# Patient Record
Sex: MALE | Race: WHITE | NOT HISPANIC OR LATINO | Employment: FULL TIME | URBAN - METROPOLITAN AREA
[De-identification: names, ages, dates, MRNs, and addresses within clinical notes are randomized per-mention and may not be internally consistent; named-entity substitution may affect disease eponyms.]

---

## 2022-10-06 PROCEDURE — U0004 COV-19 TEST NON-CDC HGH THRU: HCPCS | Performed by: PERSONAL EMERGENCY RESPONSE ATTENDANT

## 2025-01-21 ENCOUNTER — OFFICE VISIT (OUTPATIENT)
Age: 30
End: 2025-01-21
Payer: OTHER MISCELLANEOUS

## 2025-01-21 VITALS
SYSTOLIC BLOOD PRESSURE: 150 MMHG | BODY MASS INDEX: 42.48 KG/M2 | WEIGHT: 303.4 LBS | DIASTOLIC BLOOD PRESSURE: 94 MMHG | HEIGHT: 71 IN

## 2025-01-21 DIAGNOSIS — G25.89 SCAPULAR DYSKINESIS: ICD-10-CM

## 2025-01-21 DIAGNOSIS — S43.432A PARALABRAL CYST OF LEFT SHOULDER, INITIAL ENCOUNTER: ICD-10-CM

## 2025-01-21 DIAGNOSIS — S43.52XA ACROMIOCLAVICULAR SPRAIN, LEFT, INITIAL ENCOUNTER: ICD-10-CM
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
## 2025-01-21 DIAGNOSIS — S43.432A SUPERIOR GLENOID LABRUM LESION OF LEFT SHOULDER, INITIAL ENCOUNTER: ICD-10-CM

## 2025-01-21 DIAGNOSIS — M89.8X1 PERISCAPULAR PAIN: ICD-10-CM

## 2025-01-21 RX ORDER — GLIPIZIDE 10 MG/1
10 TABLET, FILM COATED, EXTENDED RELEASE ORAL DAILY
COMMUNITY
Start: 2024-10-26

## 2025-01-21 RX ORDER — LEVOTHYROXINE SODIUM 100 UG/1
1 TABLET ORAL DAILY
COMMUNITY
Start: 2025-01-03

## 2025-01-21 RX ORDER — IBUPROFEN 600 MG/1
1 TABLET, FILM COATED ORAL 3 TIMES DAILY
COMMUNITY
Start: 2024-11-19 | End: 2025-01-21

## 2025-01-21 RX ORDER — ALLOPURINOL 300 MG/1
TABLET ORAL
COMMUNITY

## 2025-01-21 RX ORDER — LOSARTAN POTASSIUM 100 MG/1
100 TABLET ORAL DAILY
COMMUNITY

## 2025-01-21 RX ORDER — EMPAGLIFLOZIN 25 MG/1
25 TABLET, FILM COATED ORAL DAILY
COMMUNITY

## 2025-01-21 RX ORDER — MELOXICAM 7.5 MG/1
TABLET ORAL
Qty: 30 TABLET | Refills: 1 | Status: SHIPPED | OUTPATIENT
Start: 2025-01-21

## 2025-01-21 RX ORDER — METOPROLOL SUCCINATE 50 MG/1
1 TABLET, EXTENDED RELEASE ORAL DAILY
COMMUNITY
Start: 2024-12-28

## 2025-01-21 NOTE — PROGRESS NOTES
[facility] [place] [facility] Office Visit - Canelo Hwang MD  [facility]    Office Visit       Patient Name: Markell Del Castillo    Chief Complaint:   Chief Complaint   Patient presents with   • Left Shoulder - Pain       Referring Physician: Sara Anderson APRN I appreciate the referral    History of Present Illness:   Markell Del Castillo is a 29 y.o. male who presents with left body part: shoulder Reason: pain.  Onset:Onset: atraumatic and gradual in nature. The issue has been ongoing for 1 year(s). Pain is a 7/10 on the pain scale. Pain is described as Pain Characterization: dull, aching, and throbbing. Associated symptoms include Symptoms: pain, popping, grinding, and giving way/buckling. The pain is worse with sleeping, lying on affected side, and certain movements ; resting, ice, and pain medication and/or NSAID improve the pain. Previous treatments have included: NSAIDS. I have reviewed the patient's history of present illness as noted/entered above.    I have reviewed the patient's past medical history, surgical history, social history, family history, medications, and allergies as noted in the electronic medical record and as noted/entered.  I have reviewed the patient's review of systems as noted/enter and updated as noted in the patient's HPI.      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, prior he was  on machine for 8 years  Date of Injury at Work: November 2024  Mechanism of Injury at Work: repetitive overuse injury; 3 step lift and turn, using both arms/2 sides; left sided machine; 30lbs part 3000x/day  Current Work Status: work restrictions 10lbs no over shoulder height, no vibrating hand tools  TREATMENTS: Ibuprofen, shoulder home exercises - guided exercises,  biofreeze, injection  BMI 42.32, 303lbs  Diagnostic Tests: MRI left shoulder 1/9/2025    LEFT SHOULDER MRI [facility] [place] 1/9/2025    He has 3 areas of pain-some pain about the AC joint, latissimus, periscapular pain    [place]  He's from [place]  His supportive wife is from [place]; [place]    Enjoys: reading, cooking, traveling        I personally reviewed the MRI above he does have a posterior labral tear with paralabral cyst formation, SLAP tearing with posterior extension.  Some signal around the AC joint, AC joint strain      29 y.o. male  Body mass index is 42.32 kg/m².    Subjective   Subjective      Review of Systems   Constitutional: Negative.  Negative for chills, fatigue and fever.   HENT: Negative.  Negative for congestion and dental problem.    Eyes: Negative.  Negative for blurred vision.   Respiratory: Negative.  Negative for shortness of breath.    Cardiovascular: Negative.  Negative for leg swelling.   Gastrointestinal: Negative.  Negative for abdominal pain.   Endocrine: Negative.  Negative for polyuria.   Genitourinary: Negative.  Negative for difficulty urinating.   Musculoskeletal:  Positive for arthralgias.   Skin: Negative.    Allergic/Immunologic: Negative.    Neurological: Negative.    Hematological: Negative.  Negative for adenopathy.   Psychiatric/Behavioral: Negative.  Negative for behavioral problems.         Past Medical History:   Past Medical History:   Diagnosis Date   • Diabetes mellitus    • Disease of thyroid gland        Past Surgical History: History reviewed. No pertinent surgical history.    Family History:   Family History   Problem Relation Age of Onset   • Diabetes Maternal Grandmother    • Diabetes Maternal Uncle        Social History:   Social History     Socioeconomic History   • Marital status:    Tobacco Use   • Smoking status: Former     Current packs/day: 0.00     Average packs/day: 0.3 packs/day for 20.8 years (5.2 ttl  "pk-yrs)     Types: Cigarettes, Cigars     Start date: 2001     Quit date: 2022     Years since quittin.6   • Smokeless tobacco: Never   • Tobacco comments:     I smoked alot when I was a kid. Stopped when I was 13. Started again at 16. Stopped at 19.   Vaping Use   • Vaping status: Never Used   Substance and Sexual Activity   • Alcohol use: Not Currently   • Drug use: Never   • Sexual activity: Yes     Partners: Female     Birth control/protection: None       Medications:   Current Outpatient Medications:   •  allopurinol (ZYLOPRIM) 300 MG tablet, , Disp: , Rfl:   •  glipizide (GLUCOTROL XL) 10 MG 24 hr tablet, Take 1 tablet by mouth Daily., Disp: , Rfl:   •  Jardiance 25 MG tablet tablet, 1 tablet Daily., Disp: , Rfl:   •  levothyroxine (SYNTHROID, LEVOTHROID) 100 MCG tablet, Take 1 tablet by mouth Daily., Disp: , Rfl:   •  losartan (COZAAR) 100 MG tablet, Take 1 tablet by mouth Daily., Disp: , Rfl:   •  metFORMIN ER (GLUCOPHAGE-XR) 500 MG 24 hr tablet, Every 12 (Twelve) Hours., Disp: , Rfl:   •  metoprolol succinate XL (TOPROL-XL) 50 MG 24 hr tablet, Take 1 tablet by mouth Daily., Disp: , Rfl:   •  meloxicam (MOBIC) 7.5 MG tablet, 1 Oral Daily with food., Disp: 30 tablet, Rfl: 1    Allergies: No Known Allergies    The following portions of the patient's history were reviewed and updated as appropriate: allergies, current medications, past family history, past medical history, past social history, past surgical history and problem list.        Objective    Objective      Vital Signs:   Vitals:    25 0759   BP: 150/94   Weight: (!) 138 kg (303 lb 6.4 oz)   Height: 180.3 cm (71\")       Ortho Exam:  Left shoulder    He has 3 areas of pain-some pain about the AC joint, latissimus, periscapular pain  Good rotator cuff strength some pain with DLS but that is not his primary pain generator.  Negative anterior biceps pain at this time.  More periscapular pain and some AC joint pain but no AC joint " instability.    Results Review:   Imaging Results (Last 24 Hours)       ** No results found for the last 24 hours. **            I personally reviewed the MRI above he does have a posterior labral tear with paralabral cyst formation, SLAP tearing with posterior extension.  Some signal around the AC joint, AC joint strain    Procedures             Assessment / Plan      Assessment/Plan:   Problem List Items Addressed This Visit          Musculoskeletal and Injuries    Work related injury - Primary    Relevant Medications    meloxicam (MOBIC) 7.5 MG tablet    Other Relevant Orders    Ambulatory Referral to Physical Therapy for Evaluation & Treatment    Superior glenoid labrum lesion of left shoulder    Relevant Medications    meloxicam (MOBIC) 7.5 MG tablet    Other Relevant Orders    Ambulatory Referral to Physical Therapy for Evaluation & Treatment    Paralabral cyst of left shoulder    Relevant Medications    meloxicam (MOBIC) 7.5 MG tablet    Other Relevant Orders    Ambulatory Referral to Physical Therapy for Evaluation & Treatment    Periscapular pain    Relevant Medications    meloxicam (MOBIC) 7.5 MG tablet    Other Relevant Orders    Ambulatory Referral to Physical Therapy for Evaluation & Treatment       Neuro    Scapular dyskinesis    Relevant Medications    meloxicam (MOBIC) 7.5 MG tablet    Other Relevant Orders    Ambulatory Referral to Physical Therapy for Evaluation & Treatment       Other    Acromioclavicular sprain, left, initial encounter       Left AC joint strain, periscapular pain, latissimus strain-I recommend scapular based physical therapy we did discuss operative versus nonoperative measures.  Operative measures would entail dealing with his posterior labral tearing, SLAP tearing however the bulk of his symptoms are more periscapular.  This may be referred pain so surgery may still be in the equation but he is hopeful to avoid surgery and I agree with conservative course.  Meloxicam provided  with counseling and physical therapy prescription for specific scapular based physical therapy    Left shoulder  10 pound lift restriction  No overhead lifting with left arm  No use of vibratory tools with left arm    MMI not reached    Physical therapy recommended, prescription provided    Follow Up: 6 weeks, no x-rays needed        Canelo Hwang MD, FAAOS  Orthopedic Surgeon, Shoulder Surgery  [facility]  Orthopedics and Sports Medicine  [address], [address]  [place] [address]    & New Location:  [facility] Location  [address], [address]  [place] [address]    01/21/25  12:51 EST

## 2025-01-22 ENCOUNTER — TELEPHONE (OUTPATIENT)
Dept: ORTHOPEDIC SURGERY | Facility: CLINIC | Age: 30
End: 2025-01-22
Payer: COMMERCIAL

## 2025-01-22 NOTE — TELEPHONE ENCOUNTER
Work note is pended, please print off work note, office note from 1/21/25 and PT order and fax to number in message below.  Lainey Plascencia RT (R), ROT

## 2025-01-22 NOTE — TELEPHONE ENCOUNTER
Caller: MARISEL  WITH DORINDA COOK    Best call back number: [phone]    What form or medical record are you requesting: OFFICE NOTE FROM 01/21/2025, WORK STATUS AND PHYSICAL THERAPY ORDER     Who is requesting this form or medical record from you: W/C     How would you like to receive the form or medical records (pick-up, mail, fax): FAX   If fax, what is the fax number: [phone]  Timeframe paperwork needed: ASAP

## 2025-01-24 ENCOUNTER — TREATMENT (OUTPATIENT)
Dept: PHYSICAL THERAPY | Facility: CLINIC | Age: 30
End: 2025-01-24
Payer: OTHER MISCELLANEOUS

## 2025-01-24 DIAGNOSIS — M25.512 ACUTE PAIN OF LEFT SHOULDER: Primary | ICD-10-CM

## 2025-01-24 NOTE — PROGRESS NOTES
Physical Therapy Initial Evaluation and Plan of Care  [facility] [place] [facility] [place]  [address] [address]        Patient: Markell Del Castillo   : 1995  Diagnosis/ICD-10 Code:  Acute pain of left shoulder [M25.512]  Referring practitioner: Canelo Hwang MD  Date of Initial Visit: 2025  Today's Date: 2025  Patient seen for 1 sessions         Visit Diagnoses:    ICD-10-CM ICD-9-CM   1. Acute pain of left shoulder  M25.512 719.41         Subjective Questionnaire: QuickDASH: 20.45     Subjective Evaluation    History of Present Illness  Mechanism of injury: 2024 L shoulder pain in the top/back of his shoulder doing his regular job which requires him to do lots of repetitive motion.  And the pain was more than normal, he sought medical care and was placed on light duty, under 10#, nothing higher than shoulder level.  He thinks the work doc gave him a cortisone injection which gave him some relief but still feels the pain. Heat gives good relief of shoulder pain symptoms.  Left side sleeper, tosses and turns rather frequently due to laying on it for too long that it increases pain  Imaging showed an AC sprain of his L shoulder and a cyst located in his posterior labrum  N/T in hands that he attributes to his carpal tunnel      Patient Occupation:  for 8 years Pain  Current pain ratin  At best pain ratin  At worst pain ratin  Quality: dull ache and throbbing  Relieving factors: rest, relaxation, heat and medications  Aggravating factors: lifting, movement, overhead activity, prolonged positioning, outstretched reach and repetitive movement  Progression: no change    Hand dominance: right    Diagnostic Tests  MRI studies: abnormal    Patient Goals  Patient goals for therapy: decreased pain, increased motion, return to work, independence with ADLs/IADLs and increased strength             Objective          Postural Observations    Additional Postural  Observation Details  Seated: Rounded shoulders, increased thoracic kyphosis, protracted scapulas    Palpation   Left   Tenderness of the teres minor.   Trigger point to teres minor.     Tenderness     Left Shoulder   Tenderness in the AC joint and lateral scapula (teres minor insertion). No tenderness in the biceps tendon (proximal), clavicle and supraspinatus tendon.     Neurological Testing     Reflexes   Left   Biceps (C5/C6): normal (2+)  Brachioradialis (C6): normal (2+)  Triceps (C7): normal (2+)    Right   Biceps (C5/C6): normal (2+)  Brachioradialis (C6): normal (2+)  Triceps (C7): normal (2+)    Active Range of Motion   Left Shoulder   Flexion: 155 degrees with pain  Abduction: 155 degrees with pain  External rotation 0°: 64 degrees   Internal rotation 0°: WFL    Right Shoulder   Flexion: 165 degrees   Abduction: 165 degrees   External rotation 0°: 75 degrees   Internal rotation 0°: WFL    Passive Range of Motion   Left Shoulder   Flexion: 165 degrees   Abduction: 165 degrees   External rotation 0°: 75 degrees     Right Shoulder   Flexion: 165 degrees   Abduction: 165 degrees   External rotation 0°: 75 degrees     Scapular Mobility   Left Shoulder   Scapular mobility: fair  Scapular Mobility with Shoulder to 90° FF   Scapular winging: moderate  Upward rotation: premature    Right Shoulder   Scapular mobility: good  Scapular Mobility with Shoulder to 90° FF   Scapular winging: minimal    Joint Play   Left Shoulder  Hypermobile in the AC joint. Hypomobile in the anterior capsule, posterior capsule and inferior capsule.    Right Shoulder  Joints within functional limits are the anterior capsule, posterior capsule, inferior capsule and AC joint.     Strength/Myotome Testing     Left Shoulder     Planes of Motion   Flexion: 4- (pain)   Abduction: 4- (pain)   External rotation at 0°: 4-   Internal rotation at 0°: 5     Right Shoulder     Planes of Motion   Flexion: 5   Abduction: 5   External rotation at 0°: 5    Internal rotation at 0°: 5     Additional Strength Details  Scapular strength: 3/5 functionally    Tests   Cervical     Left   Negative active compression (Gila).     Left Shoulder   Positive AC shear, empty can, full can and painful arc.   Negative Hawkin's, Speed's and Yergason's.     Additional Tests Details  (+) Lon          Assessment & Plan       Assessment  Impairments: abnormal muscle firing, abnormal muscle tone, abnormal or restricted ROM, activity intolerance, impaired physical strength, lacks appropriate home exercise program and pain with function   Functional limitations: carrying objects, lifting, sleeping, pulling, pushing, uncomfortable because of pain, reaching behind back, reaching overhead and unable to perform repetitive tasks   Assessment details: 29 year old right hand male presenting with left AC joint shoulder pain.  They present signs and symptoms of decreased range of motion, strength, pain with function, and inability to fully participate in work activities.  He demonstrates (+) AC shear, empty/full can, and painful arc likely indicating some level of rotator cuff involvement though was not tender to palpation over any of the cuff tendons.  Postural dysfunction may be contributing to symptoms.  Neurological exam is normal today, cervical exam is normal today.  He will require skilled physical therapy in order to address these deficits to return to full-time full duty with no restrictions.  Prognosis: good    Goals  Plan Goals: STG to be met in 4 weeks  1. Increase L shoulder AROM equal to R shoulder with tolerable discomfort to be able to place objects on shelves over head.  2. Increase L  shoulder external rotation ROM to 70 degrees to assist with less painful dressing.  3. Pt reports decrease in worst pain level to 4/10, so that the pt can perform more activities at home and work.  4. Pt has improved functional activities so that Quick Dash score improves by 5 points.  5. Pt L  scapular strength is 4/5 so that there is a reduction in scapular winging during AROM.    LTG to be met in 12 weeks  1. Pt is independent with each phase of HEP.  2. Pt is able sleep through night without being awaken by shoulder pain.  3. Pt can bear full weight through L UE without shoulder pain so that they can crawl and push objects as needs.  4.  Pt has full L shoulder AROM to be able to do all need activities at home and work.  5.  Pt shoulder girdle strength is 5/5 throughout to perform all lifting activities as needed.    Plan  Therapy options: will be seen for skilled therapy services  Planned modality interventions: iontophoresis, cryotherapy, dry needling, ultrasound, high voltage pulsed current (pain management), TENS, thermotherapy (hydrocollator packs) and electrical stimulation/Russian stimulation  Planned therapy interventions: abdominal trunk stabilization, manual therapy, neuromuscular re-education, body mechanics training, flexibility, functional ROM exercises, joint mobilization, home exercise program, stretching, strengthening, therapeutic activities, soft tissue mobilization and postural training  Frequency: 2x week  Duration in weeks: 12  Treatment plan discussed with: patient        Timed:         Manual Therapy:    10     mins  01439;     Therapeutic Exercise:         mins  97846;     Neuromuscular Michelle:        mins  68582;    Therapeutic Activity:     25     mins  83968;     Gait Training:           mins  14783;     Ultrasound:          mins  79785;    Ionto                                   mins   47846  Self Care                            mins   33578  Canalith Repos         mins 28807      Un-Timed:  Electrical Stimulation:         mins  68619 ( );  Dry Needling          mins self-pay  Traction          mins 42232  Low Eval          Mins  32403  Mod Eval     30     Mins  68004  High Eval                            Mins  32453        Timed Treatment:   35   mins   Total Treatment:      65   mins      PT: Emma Garduno, ANA     License Number: [id number]  Electronically signed by Emma Garduno PT, 01/24/25, 8:08 AM EST    Initial Certification  Certification Period: 4/24/2025  I certify that the therapy services are furnished while this patient is under my care.  The services outlined above are required by this patient, and will be reviewed every 90 days.     PHYSICIAN: ________________________________________________________  Canelo Hwang MD        DATE: ____________________________________________________________    Please sign and return via fax to @[phone]@.. Thank you, [facility].

## 2025-01-27 ENCOUNTER — TREATMENT (OUTPATIENT)
Dept: PHYSICAL THERAPY | Facility: CLINIC | Age: 30
End: 2025-01-27
Payer: OTHER MISCELLANEOUS

## 2025-01-27 DIAGNOSIS — M25.512 ACUTE PAIN OF LEFT SHOULDER: Primary | ICD-10-CM

## 2025-01-27 PROCEDURE — 97112 NEUROMUSCULAR REEDUCATION: CPT

## 2025-01-27 PROCEDURE — 97530 THERAPEUTIC ACTIVITIES: CPT

## 2025-01-27 PROCEDURE — 97140 MANUAL THERAPY 1/> REGIONS: CPT

## 2025-01-27 NOTE — PROGRESS NOTES
Physical Therapy Daily Treatment Note  [facility] [place]  [address], [address]  [place] [address]       Patient: Markell Del Castillo   : 1995  Diagnosis/ICD-10 Code:  Acute pain of left shoulder [M25.512]  Referring practitioner: No ref. provider found  Date of Initial Visit: Type: THERAPY  Noted: 2025  Today's Date: 2025  Patient seen for 2 sessions         Visit Diagnoses:    ICD-10-CM ICD-9-CM   1. Acute pain of left shoulder  M25.512 719.41       Subjective   Markell Del Castillo reports: HEP has been going well. Overall no significant difference or change since his IE.    Access Code: [email or web address]  URL: [email or web address]  Date: 2025  Prepared by: Emma Garduno    Exercises  - Sidelying Shoulder Abduction Palm Forward  - 1 x daily - 7 x weekly - 4 sets - 10 reps  - Sidelying Shoulder External Rotation  - 1 x daily - 7 x weekly - 4 sets - 10 reps  - Sidelying Shoulder Flexion 15 Degrees  - 1 x daily - 7 x weekly - 4 sets - 10 reps  - Supine PNF D2 Flexion with Resistance  - 1 x daily - 7 x weekly - 3 sets - 10 reps    Objective     See Exercise, Manual, and Modality Logs for complete treatment.       Assessment/Plan  Patient tolerated today's session well with progression of scapular muscular reeducation and shoulder stabilization.  He required tactile cueing in order to decrease compensatory muscle activity.  Seems that left latissimus dorsi is tight and potentially limiting full shoulder mobility, this was addressed with stretching activities which he responded well to and improved shoulder mobility after the stretch. Will continue to progress per his tolerance.  Progress per Plan of Care and Progress strengthening /stabilization /functional activity           Timed:         Manual Therapy:    10     mins  69717;     Therapeutic Exercise:         mins  42311;     Neuromuscular Michelle:    20    mins  83559;    Therapeutic Activity:     18     mins  00925;     Gait Training:            mins  19581;     Ultrasound:          mins  64836;    Ionto                                   mins   32563  Self Care                            mins   33798  Canalith Repos         mins 45570      Un-Timed:  Electrical Stimulation:         mins  51574 ( );  Dry Needling          mins self-pay  Traction          mins 67805      Timed Treatment:   48   mins   Total Treatment:     48   mins      Emma Garduno, PT  Physical Therapist

## 2025-01-31 ENCOUNTER — TREATMENT (OUTPATIENT)
Dept: PHYSICAL THERAPY | Facility: CLINIC | Age: 30
End: 2025-01-31
Payer: OTHER MISCELLANEOUS

## 2025-01-31 DIAGNOSIS — M25.512 ACUTE PAIN OF LEFT SHOULDER: Primary | ICD-10-CM

## 2025-01-31 NOTE — PROGRESS NOTES
Physical Therapy Daily Treatment Note  [facility] [place]  [address], [address]  [place] [address]       Patient: Markell Del Castillo   : 1995  Diagnosis/ICD-10 Code:  Acute pain of left shoulder [M25.512]  Referring practitioner: Canelo Hwang MD  Date of Initial Visit: Type: THERAPY  Noted: 2025  Today's Date: 2025  Patient seen for 3 sessions         Visit Diagnoses:    ICD-10-CM ICD-9-CM   1. Acute pain of left shoulder  M25.512 719.41       Subjective   Markell Del Castillo reports: he's been feeling some pain in his anterior shoulder when he completes the ER isometric exercise at home. But overall feels like he is improving.    Objective     See Exercise, Manual, and Modality Logs for complete treatment.       Assessment/Plan  Patient tolerated progression of today's exercises well.  He noted some discomfort in his lower left shoulder blade with shoulder extension exercise, he was instructed through eccentric low trap reeducation for appropriate timing and sequencing.  He returned to the shoulder extension exercise with appropriate timing and sequencing and resolution of discomfort.  He was instructed to complete eccentric lower trap exercise at home and he was able to demonstrate independently.  Will continue to progress per his tolerance.    Progress per Plan of Care and Progress strengthening /stabilization /functional activity           Timed:         Manual Therapy:    15     mins  36311;     Therapeutic Exercise:    15     mins  71543;     Neuromuscular Michelle:    15    mins  69072;    Therapeutic Activity:     12     mins  23220;     Gait Training:           mins  29608;     Ultrasound:          mins  53130;    Ionto                                   mins   44014  Self Care                            mins   55174  Canalith Repos         mins 28287      Un-Timed:  Electrical Stimulation:         mins  50192 ( );  Dry Needling          mins self-pay  Traction          mins  54891      Timed Treatment:   57   mins   Total Treatment:     57   mins      Emma Garduno, PT  Physical Therapist

## 2025-02-05 ENCOUNTER — TREATMENT (OUTPATIENT)
Dept: PHYSICAL THERAPY | Facility: CLINIC | Age: 30
End: 2025-02-05
Payer: OTHER MISCELLANEOUS

## 2025-02-05 DIAGNOSIS — M25.512 ACUTE PAIN OF LEFT SHOULDER: Primary | ICD-10-CM

## 2025-02-05 NOTE — PROGRESS NOTES
Physical Therapy Daily Treatment Note  [facility]- [place]  [address], [facility] [address]  [place] [address]       Patient: Markell Del Castillo   : 1995  Diagnosis/ICD-10 Code:  Acute pain of left shoulder [M25.512]  Referring practitioner: Canelo Hwang MD  Date of Initial Visit: Type: THERAPY  Noted: 2025  Today's Date: 2025  Patient seen for 4 sessions         Visit Diagnoses:    ICD-10-CM ICD-9-CM   1. Acute pain of left shoulder  M25.512 719.41       Subjective   Markell Del Castillo reports: his shoulder has been feeling better every session.    Objective     See Exercise, Manual, and Modality Logs for complete treatment.       Assessment/Plan  Patient tolerated progression of scapular reeducation well and increased loading with strengthening activities with minimal exacerbation of shoulder pain.  He continues to respond well with manual therapy completed prior to progress to the interventions and he was educated to complete stretches at home prior to activities, he demonstrated understanding.  Will continue to progress per his tolerance.    Progress per Plan of Care and Progress strengthening /stabilization /functional activity           Timed:         Manual Therapy:    15     mins  61430;     Therapeutic Exercise:    30     mins  46308;     Neuromuscular Michlele:    8    mins  90417;    Therapeutic Activity:          mins  57152;     Gait Training:           mins  20884;     Ultrasound:          mins  68353;    Ionto                                   mins   14291  Self Care                            mins   19481  Canalith Repos         mins 49155      Un-Timed:  Electrical Stimulation:         mins  27507 ( );  Dry Needling          mins self-pay  Traction          mins 61641      Timed Treatment:   53   mins   Total Treatment:     53   mins      Emma Garduno PT  Physical Therapist

## 2025-02-07 ENCOUNTER — TREATMENT (OUTPATIENT)
Dept: PHYSICAL THERAPY | Facility: CLINIC | Age: 30
End: 2025-02-07
Payer: OTHER MISCELLANEOUS

## 2025-02-07 DIAGNOSIS — M25.512 ACUTE PAIN OF LEFT SHOULDER: Primary | ICD-10-CM

## 2025-02-07 NOTE — PROGRESS NOTES
Physical Therapy Daily Treatment Note  [facility] [place]  [address] [facility] [address]  [place] [address]       Patient: Markell Del Castillo   : 1995  Diagnosis/ICD-10 Code:  Acute pain of left shoulder [M25.512]  Referring practitioner: Canelo Hwang MD  Date of Initial Visit: Type: THERAPY  Noted: 2025  Today's Date: 2025  Patient seen for 5 sessions         Visit Diagnoses:    ICD-10-CM ICD-9-CM   1. Acute pain of left shoulder  M25.512 719.41       Subjective   Markell Del Castillo reports: His shoulder has been relatively normal.  He was sore for about 2 days following his last session.  But overall feels like he is improving.    Objective     See Exercise, Manual, and Modality Logs for complete treatment.       Assessment/Plan  Patient tolerates progression of all activities well with no symptom exacerbation.  He continues to feel like his main limiter is latissimus dorsi tightness which improves following increased duration of stretching in today's session.  He does fatigue moderately quickly with overhead strengthening activities likely due to decreased muscular endurance as part of his injury.  He was educated to continue his HEP at home and demonstrated good understanding.  Will continue to progress per his tolerance.  Am please with the quick progress he has made in a short amount of time.    Progress per Plan of Care and Progress strengthening /stabilization /functional activity           Timed:         Manual Therapy:         mins  45581;     Therapeutic Exercise:    15     mins  26958;     Neuromuscular Michelle:    15    mins  06367;    Therapeutic Activity:     10     mins  30047;     Gait Training:           mins  57943;     Ultrasound:          mins  77885;    Ionto                                   mins   69296  Self Care                            mins   48419  Canalith Repos         mins 91897      Un-Timed:  Electrical Stimulation:         mins  80200 ( );  Dry Needling           mins self-pay  Traction          mins 62754      Timed Treatment:   40   mins   Total Treatment:     40   mins      Emma Garduno PT  Physical Therapist

## 2025-02-10 ENCOUNTER — TREATMENT (OUTPATIENT)
Dept: PHYSICAL THERAPY | Facility: CLINIC | Age: 30
End: 2025-02-10
Payer: OTHER MISCELLANEOUS

## 2025-02-10 DIAGNOSIS — M25.512 ACUTE PAIN OF LEFT SHOULDER: Primary | ICD-10-CM

## 2025-02-10 PROCEDURE — 97110 THERAPEUTIC EXERCISES: CPT

## 2025-02-10 PROCEDURE — 97140 MANUAL THERAPY 1/> REGIONS: CPT

## 2025-02-10 PROCEDURE — 97112 NEUROMUSCULAR REEDUCATION: CPT

## 2025-02-10 NOTE — PROGRESS NOTES
Physical Therapy Daily Treatment Note  [facility] [place]  [address], [facility] [address]  [place] [address]       Patient: Markell Del Castillo   : 1995  Diagnosis/ICD-10 Code:  Acute pain of left shoulder [M25.512]  Referring practitioner: Canelo Hwang MD  Date of Initial Visit: Type: THERAPY  Noted: 2025  Today's Date: 2/10/2025  Patient seen for 6 sessions         Visit Diagnoses:    ICD-10-CM ICD-9-CM   1. Acute pain of left shoulder  M25.512 719.41       Subjective   Markell Del Castillo reports: he was fairly sore following last session but feels like he is improving.     Objective     See Exercise, Manual, and Modality Logs for complete treatment.       Assessment/Plan  Patient demonstrates improved symptoms of shoulder tightness following extensive manual therapy.  Specifically has tightness of latissimus dorsi likely due to muscle guarding from initial injury.  Patient tolerated progression of muscular reeducation and strengthening activities well.  He continues to fatigue quickly likely due to poor muscular endurance and will continue to benefit from endurance based activities both in clinic and at home.  Will continue to progress per his tolerance.  Progress per Plan of Care and Progress strengthening /stabilization /functional activity           Timed:         Manual Therapy:    10     mins  46802;     Therapeutic Exercise:    15     mins  47884;     Neuromuscular Michelle:    15    mins  05356;    Therapeutic Activity:          mins  50895;     Gait Training:           mins  03278;     Ultrasound:          mins  81686;    Ionto                                   mins   30913  Self Care                            mins   43127  Canalith Repos         mins 25688      Un-Timed:  Electrical Stimulation:         mins  77986 (MC );  Dry Needling          mins self-pay  Traction          mins 94969      Timed Treatment:   40   mins   Total Treatment:     40   mins      Emma Garduno PT  Physical  Therapist

## 2025-02-14 ENCOUNTER — TREATMENT (OUTPATIENT)
Dept: PHYSICAL THERAPY | Facility: CLINIC | Age: 30
End: 2025-02-14
Payer: OTHER MISCELLANEOUS

## 2025-02-14 DIAGNOSIS — M25.512 ACUTE PAIN OF LEFT SHOULDER: Primary | ICD-10-CM

## 2025-02-14 NOTE — PROGRESS NOTES
Physical Therapy Daily Treatment Note  [facility] [place]  [address], [facility] [address]  [place] [address]       Patient: aMrkell Del Castillo   : 1995  Diagnosis/ICD-10 Code:  Acute pain of left shoulder [M25.512]  Referring practitioner: Canelo Hwang MD  Date of Initial Visit: Type: THERAPY  Noted: 2025  Today's Date: 2025  Patient seen for 7 sessions         Visit Diagnoses:    ICD-10-CM ICD-9-CM   1. Acute pain of left shoulder  M25.512 719.41       Subjective   Markell Del Castillo reports: shoulder was sore for about 2-3 days after last session.    Objective     See Exercise, Manual, and Modality Logs for complete treatment.       Assessment/Plan  Patient demonstrates improved muscular endurance throughout today's session and was able to complete a longer duration of endurance based exercises before fatigue sets in.  He demonstrates mild scapular dyskinesis with certain activities that he is able to correct following verbal cues.  Will continue to progress per his tolerance.    Progress per Plan of Care and Progress strengthening /stabilization /functional activity           Timed:         Manual Therapy:         mins  51698;     Therapeutic Exercise:    15     mins  37342;     Neuromuscular Michelle:    15    mins  09697;    Therapeutic Activity:     25     mins  00954;     Gait Training:           mins  60219;     Ultrasound:          mins  62256;    Ionto                                   mins   49253  Self Care                            mins   04696  Canalith Repos         mins 65456      Un-Timed:  Electrical Stimulation:         mins  23965 ( );  Dry Needling          mins self-pay  Traction          mins 96913      Timed Treatment:   55   mins   Total Treatment:     55   mins      Emma Garduno PT  Physical Therapist

## 2025-02-17 ENCOUNTER — TREATMENT (OUTPATIENT)
Dept: PHYSICAL THERAPY | Facility: CLINIC | Age: 30
End: 2025-02-17
Payer: OTHER MISCELLANEOUS

## 2025-02-17 DIAGNOSIS — M25.512 ACUTE PAIN OF LEFT SHOULDER: Primary | ICD-10-CM

## 2025-02-17 PROCEDURE — 97112 NEUROMUSCULAR REEDUCATION: CPT

## 2025-02-17 PROCEDURE — 97530 THERAPEUTIC ACTIVITIES: CPT

## 2025-02-17 PROCEDURE — 97140 MANUAL THERAPY 1/> REGIONS: CPT

## 2025-02-17 PROCEDURE — 97110 THERAPEUTIC EXERCISES: CPT

## 2025-02-17 NOTE — PROGRESS NOTES
Physical Therapy Daily Treatment Note  [facility] [place]  [address], [facility] [address]  [place] [address]       Patient: Markell Del Castillo   : 1995  Diagnosis/ICD-10 Code:  Acute pain of left shoulder [M25.512]  Referring practitioner: Canelo Hwang MD  Date of Initial Visit: Type: THERAPY  Noted: 2025  Today's Date: 2025  Patient seen for 8 sessions         Visit Diagnoses:    ICD-10-CM ICD-9-CM   1. Acute pain of left shoulder  M25.512 719.41       Subjective   Markell Del Castillo reports: he feels mainly limited with loaded extension exercises.    Objective     See Exercise, Manual, and Modality Logs for complete treatment.       Assessment/Plan  Patient tolerated progressions of loading exercises well though was significantly fatigued at the end of today's session. He demonstrates improved ability to tolerate more load with the LUE. Scapular dyskinesis has improved though starts to return once he's more fatigued. Will continue to progress per his tolerance.     Progress per Plan of Care and Progress strengthening /stabilization /functional activity           Timed:         Manual Therapy:    8     mins  19460;     Therapeutic Exercise:    15     mins  38582;     Neuromuscular Michelle:    15    mins  67917;    Therapeutic Activity:     15     mins  94116;     Gait Training:           mins  31274;     Ultrasound:          mins  12911;    Ionto                                   mins   03591  Self Care                            mins   49916  Canalith Repos         mins 18353      Un-Timed:  Electrical Stimulation:         mins  61926 ( );  Dry Needling          mins self-pay  Traction          mins 03745      Timed Treatment:   53   mins   Total Treatment:     53   mins      Emma Garduno PT  Physical Therapist

## 2025-02-21 ENCOUNTER — TREATMENT (OUTPATIENT)
Dept: PHYSICAL THERAPY | Facility: CLINIC | Age: 30
End: 2025-02-21
Payer: OTHER MISCELLANEOUS

## 2025-02-21 DIAGNOSIS — M25.512 ACUTE PAIN OF LEFT SHOULDER: Primary | ICD-10-CM

## 2025-02-21 NOTE — PROGRESS NOTES
Physical Therapy Daily Treatment Note  [facility]- [place]  [address], [facility] [address]  [place] [address]       Patient: Markell Del Castillo   : 1995  Diagnosis/ICD-10 Code:  Acute pain of left shoulder [M25.512]  Referring practitioner: Canelo Hwang MD  Date of Initial Visit: Type: THERAPY  Noted: 2025  Today's Date: 2025  Patient seen for 9 sessions         Visit Diagnoses:    ICD-10-CM ICD-9-CM   1. Acute pain of left shoulder  M25.512 719.41       Subjective   Markell Del Castillo reports: he has not woken up with no shoulder pain the past two days.     Objective     See Exercise, Manual, and Modality Logs for complete treatment.       Assessment/Plan  Patient continues to demonstrate improvement of mobility, strength, and function as well as good tolerance to progression of activities in the clinic.  He required verbal cueing for appropriate timing and sequencing of scapular musculature, he was able to demonstrate correctly following cueing.  He was instructed to continue current HEP and he demonstrated good understanding.  Will continue to progress per his tolerance.  Progress per Plan of Care and Progress strengthening /stabilization /functional activity           Timed:         Manual Therapy:         mins  42198;     Therapeutic Exercise:    22     mins  24402;     Neuromuscular Michelle:    8    mins  52488;    Therapeutic Activity:     25     mins  43341;     Gait Training:           mins  00095;     Ultrasound:          mins  70844;    Ionto                                   mins   82420  Self Care                            mins   97914  Canalith Repos         mins 76856      Un-Timed:  Electrical Stimulation:         mins  47577 (MC );  Dry Needling          mins self-pay  Traction          mins 95293      Timed Treatment:   55   mins   Total Treatment:     55   mins      Emma Garduno PT  Physical Therapist   Biopsy Type: H and E Consent: Written consent was obtained and risks were reviewed including but not limited to scarring, infection, bleeding, scabbing, incomplete removal, nerve damage and allergy to anesthesia. Electrodesiccation And Curettage Text: The wound bed was treated with electrodesiccation and curettage after the biopsy was performed. Anesthesia Volume In Cc (Will Not Render If 0): 0.5 Render Post-Care Instructions In Note?: no Silver Nitrate Text: The wound bed was treated with silver nitrate after the biopsy was performed. Was A Bandage Applied: Yes Hemostasis: Drysol Curettage Text: The wound bed was treated with curettage after the biopsy was performed. Detail Level: Simple Biopsy Method: Dermablade Billing Type: Third-Party Bill Lab: [id number] Post-Care Instructions: I reviewed with the patient in detail post-care instructions. Patient is to keep the biopsy site dry overnight, and then apply bacitracin twice daily until healed. Patient may apply hydrogen peroxide soaks to remove any crusting. Cryotherapy Text: The wound bed was treated with cryotherapy after the biopsy was performed. Lab Facility: [id number] Additional Anesthesia Volume In Cc (Will Not Render If 0): 0 Anesthesia Type: 1% lidocaine with epinephrine Wound Care: Petrolatum Dressing: bandage Notification Instructions: Patient will be notified of biopsy results. However, patient instructed to call the office if not contacted within 2 weeks. Electrodesiccation Text: The wound bed was treated with electrodesiccation after the biopsy was performed. Depth Of Biopsy: dermis Type Of Destruction Used: Curettage

## 2025-02-24 ENCOUNTER — TREATMENT (OUTPATIENT)
Dept: PHYSICAL THERAPY | Facility: CLINIC | Age: 30
End: 2025-02-24
Payer: OTHER MISCELLANEOUS

## 2025-02-24 DIAGNOSIS — M25.512 ACUTE PAIN OF LEFT SHOULDER: Primary | ICD-10-CM

## 2025-02-24 NOTE — PROGRESS NOTES
Physical Therapy Daily Treatment Note  [facility]- [place]  [address], [facility] [address]  [place] [address]       Patient: Markell Del Castillo   : 1995  Diagnosis/ICD-10 Code:  Acute pain of left shoulder [M25.512]  Referring practitioner: Canelo Hwang MD  Date of Initial Visit: Type: THERAPY  Noted: 2025  Today's Date: 2025  Patient seen for 10 sessions         Visit Diagnoses:    ICD-10-CM ICD-9-CM   1. Acute pain of left shoulder  M25.512 719.41       Subjective   Markell Del Castillo reports: no pain over the weekend. Overall he feels like he's 90% better since his initial injury.    Objective     See Exercise, Manual, and Modality Logs for complete treatment.       Assessment/Plan  Patient demonstrates good tolerance with progression strengthening, muscular reeducation, and functional movements.  Patient demonstrates improved timing and sequencing of his scapular musculature with all activities and is able to complete without eliciting painful symptoms.  Patient has demonstrated good overall improvement since his initial evaluation and I expect his timely return to full job duties within the next couple weeks once he is cleared by Dr. Hwang.  Plan for full reassessment at his next visit prior to seeing Dr. Hwang for his follow-up visit.  Progress per Plan of Care and Progress strengthening /stabilization /functional activity           Timed:         Manual Therapy:         mins  09755;     Therapeutic Exercise:    15     mins  35355;     Neuromuscular Michelle:    15    mins  04374;    Therapeutic Activity:     25     mins  76315;     Gait Training:           mins  29000;     Ultrasound:          mins  58026;    Ionto                                   mins   58313  Self Care                            mins   12288  Canalith Repos         mins 10147      Un-Timed:  Electrical Stimulation:         mins  80986 ( );  Dry Needling          mins self-pay  Traction          mins 23901      Timed  Treatment:   55   mins   Total Treatment:     55   mins      Emma Garduno, PT  Physical Therapist

## 2025-02-28 ENCOUNTER — TREATMENT (OUTPATIENT)
Dept: PHYSICAL THERAPY | Facility: CLINIC | Age: 30
End: 2025-02-28
Payer: OTHER MISCELLANEOUS

## 2025-02-28 DIAGNOSIS — M25.512 ACUTE PAIN OF LEFT SHOULDER: Primary | ICD-10-CM

## 2025-02-28 NOTE — PROGRESS NOTES
Physical Therapy Daily Treatment Note  [facility]- [place]  [address], [facility] [address]  [place] [address]       Patient: Markell Del Castillo   : 1995  Diagnosis/ICD-10 Code:  Acute pain of left shoulder [M25.512]  Referring practitioner: Canelo Hwang MD  Date of Initial Visit: Type: THERAPY  Noted: 2025  Today's Date: 2025  Patient seen for 11 sessions         Visit Diagnoses:    ICD-10-CM ICD-9-CM   1. Acute pain of left shoulder  M25.512 719.41       Subjective   Markell Del Castillo reports: his shoulder has been feeling a lot better than his initial evaluation. He has been able to do all the motions he is required at to complete at work with no pain or difficulties. He is able to do all of his house chores and his hygiene without any pain.   QuickDASH: 0%  Pain: 0/10    Objective          Active Range of Motion   Left Shoulder   Flexion: 165 degrees   Abduction: 165 degrees   External rotation 0°: 75 degrees     Right Shoulder   Flexion: 160 degrees   Abduction: 160 degrees   External rotation 0°: 75 degrees     Strength/Myotome Testing     Left Shoulder     Planes of Motion   Flexion: 5   Abduction: 5   External rotation at 0°: 5   Internal rotation at 0°: 5     Isolated Muscles   Lower trapezius: 4+   Rhomboids: 4+   Subscapularis: 5     Right Shoulder     Planes of Motion   Flexion: 5   Abduction: 5   External rotation at 0°: 5   Internal rotation at 0°: 5     Isolated Muscles   Lower trapezius: 4+   Rhomboids: 4+   Subscapularis: 5     Tests   Cervical     Left   Negative active compression ([place]).     Left Shoulder   Negative AC shear, empty can and painful arc.     Additional Tests Details  (-) Lon        See Exercise, Manual, and Modality Logs for complete treatment.       Assessment/Plan  Patient has met all physical therapy goals for this episode of care. He has full AROM, strength, and function of his left shoulder with today's reassessment test and measures. His QuickDASH  score has improve to 0% disability. He was instructed to reach out if symptoms return or his function worsens.    Other discharge for this episode of care.    STG to be met in 4 weeks  1. Increase L shoulder AROM equal to R shoulder with tolerable discomfort to be able to place objects on shelves over head. MET  2. Increase L  shoulder external rotation ROM to 70 degrees to assist with less painful dressing. MET  3. Pt reports decrease in worst pain level to 4/10, so that the pt can perform more activities at home and work. MET  4. Pt has improved functional activities so that Quick Dash score improves by 5 points. MET  5. Pt L scapular strength is 4/5 so that there is a reduction in scapular winging during AROM. MET     LTG to be met in 12 weeks  1. Pt is independent with each phase of HEP. MET  2. Pt is able sleep through night without being awaken by shoulder pain. MET  3. Pt can bear full weight through L UE without shoulder pain so that they can crawl and push objects as needs. MET  4.  Pt has full L shoulder AROM to be able to do all need activities at home and work. MET  5.  Pt shoulder girdle strength is 5/5 throughout to perform all lifting activities as needed. MET       Timed:         Manual Therapy:         mins  93019;     Therapeutic Exercise:         mins  83629;     Neuromuscular Michelle:        mins  46696;    Therapeutic Activity:     30     mins  44318;     Gait Training:           mins  27901;     Ultrasound:          mins  32157;    Ionto                                   mins   70057  Self Care                            mins   68404  Canalith Repos         mins 06604      Un-Timed:  Electrical Stimulation:         mins  31634 ( );  Dry Needling          mins self-pay  Traction          mins 65025      Timed Treatment:   30   mins   Total Treatment:     30   mins      Emma Garduno PT  Physical Therapist

## 2025-03-04 ENCOUNTER — OFFICE VISIT (OUTPATIENT)
Age: 30
End: 2025-03-04
Payer: OTHER MISCELLANEOUS

## 2025-03-04 VITALS
WEIGHT: 301 LBS | DIASTOLIC BLOOD PRESSURE: 100 MMHG | HEIGHT: 71 IN | SYSTOLIC BLOOD PRESSURE: 152 MMHG | BODY MASS INDEX: 42.14 KG/M2

## 2025-03-04 DIAGNOSIS — S43.432A PARALABRAL CYST OF LEFT SHOULDER, INITIAL ENCOUNTER: ICD-10-CM

## 2025-03-04 DIAGNOSIS — M89.8X1 PERISCAPULAR PAIN: ICD-10-CM

## 2025-03-04 DIAGNOSIS — S43.52XA ACROMIOCLAVICULAR SPRAIN, LEFT, INITIAL ENCOUNTER: ICD-10-CM

## 2025-03-04 DIAGNOSIS — S43.432A SUPERIOR GLENOID LABRUM LESION OF LEFT SHOULDER, INITIAL ENCOUNTER: Primary | ICD-10-CM

## 2025-03-04 DIAGNOSIS — G25.89 SCAPULAR DYSKINESIS: ICD-10-CM
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
## 2025-03-04 PROCEDURE — 99213 OFFICE O/P EST LOW 20 MIN: CPT | Performed by: ORTHOPAEDIC SURGERY

## 2025-03-04 NOTE — PROGRESS NOTES
[facility] Orthopaedic Surgery Office Follow Up - Canelo Hwang MD  [facility]    Office Follow Up Visit       Patient Name: Markell Del Castillo    Chief Complaint:   Chief Complaint   Patient presents with   • Follow-up     6 week recheck - Superior glenoid labrum lesion of left shoulder;   Work related injury;  Paralabral cyst of left shoulder; Periscapular pain           Referring Physician: Bettina Vera, DO  - I appreciate the referral    History of Present Illness:   It has been 6  week(s) since Markell Del Castillo's last visit. Markell Del Castillo returns to clinic today for F/U: follow-up of leftBody Part: shoulderReason: pain. The issue has been ongoing for 1 year(s). Markell Del Castillo rates HIS/HER: hispain at 0/10 on the pain scale. Previous/current treatments: NSAIDS and physical therapy. Current symptoms:Symptoms: grinding. The pain is worse with  with rotation ; resting and heat improves the pain. Overall, he/she: heis doing better but still having some limitations. I have reviewed the patient's history of present illness as noted/entered above.    I have reviewed the patient's past medical history, surgical history, social history, family history, medications, and allergies as noted in the electronic medical record and as noted/entered.  I have reviewed the patient's review of systems as noted/enter and updated as noted in the patient's HPI.      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, prior he was  on machine for 8 years  Date of Injury at Work: November 2024  Mechanism of Injury at Work: repetitive overuse injury; 3 step lift and turn, using both arms/2 sides; left sided machine; 30lbs part 3000x/day  Current Work Status: work restrictions 10lbs no over shoulder height, no  vibrating hand tools  TREATMENTS: Ibuprofen, shoulder home exercises - guided exercises, biofreeze, injection  BMI 42.32, 303lbs  Diagnostic Tests: MRI left shoulder 1/9/2025     LEFT SHOULDER MRI [facility] and Open MRI [place] 1/9/2025     He has 3 areas of pain-some pain about the AC joint, latissimus, periscapular pain     [place]  He's from [facility]  His supportive wife is from [facility]; [place]     Enjoys: reading, cooking, traveling          I personally reviewed the MRI above he does have a posterior labral tear with paralabral cyst formation, SLAP tearing with posterior extension.  Some signal around the AC joint, AC joint strain        29 y.o. male  Body mass index is 42.32 kg/m².    PRIOR:  Left AC joint strain, periscapular pain, latissimus strain-I recommend scapular based physical therapy we did discuss operative versus nonoperative measures.  Operative measures would entail dealing with his posterior labral tearing, SLAP tearing however the bulk of his symptoms are more periscapular.  This may be referred pain so surgery may still be in the equation but he is hopeful to avoid surgery and I agree with conservative course.  Meloxicam provided with counseling and physical therapy prescription for specific scapular based physical therapy     Left shoulder  10 pound lift restriction  No overhead lifting with left arm  No use of vibratory tools with left arm      3/4/2025:  LEFT SHOULDER  Completed PT  Improved/better but still has some concerns about heavy lifting he has 1 process where he has to lift at least 30 pounds or more to 200 times in 2-hour shifts.  Supportive wife present.  PT with [employer] work  Counseled on increasing the weight amount to 20 pounds still avoiding overhead lifting with hopes that he will be ready for a full duty when we see him back in 6 weeks with 1 additional visit to follow anticipate      Subjective   Subjective      Review of Systems    Constitutional:  Negative for chills, fever, unexpected weight gain and unexpected weight loss.   HENT:  Negative for congestion, postnasal drip and rhinorrhea.    Eyes:  Negative for blurred vision.   Respiratory:  Negative for shortness of breath.    Cardiovascular:  Negative for leg swelling.   Gastrointestinal:  Negative for abdominal pain, nausea and vomiting.   Genitourinary:  Negative for difficulty urinating.   Musculoskeletal:  Positive for arthralgias. Negative for gait problem, joint swelling and myalgias.   Skin:  Negative for skin lesions and wound.   Neurological:  Negative for dizziness, weakness, light-headedness and numbness.   Hematological:  Does not bruise/bleed easily.   Psychiatric/Behavioral:  Negative for depressed mood.    All other systems reviewed and are negative.       Past Medical History:   Past Medical History:   Diagnosis Date   • Diabetes mellitus    • Disease of thyroid gland        Past Surgical History: No past surgical history on file.    Family History:   Family History   Problem Relation Age of Onset   • Diabetes Maternal Grandmother    • Diabetes Maternal Uncle        Social History:   Social History     Socioeconomic History   • Marital status:    Tobacco Use   • Smoking status: Former     Current packs/day: 0.00     Average packs/day: 0.3 packs/day for 20.8 years (5.2 ttl pk-yrs)     Types: Cigarettes, Cigars     Start date: 2001     Quit date: 2022     Years since quittin.7   • Smokeless tobacco: Never   • Tobacco comments:     I smoked alot when I was a kid. Stopped when I was 13. Started again at 16. Stopped at 19.   Vaping Use   • Vaping status: Never Used   Substance and Sexual Activity   • Alcohol use: Not Currently   • Drug use: Never   • Sexual activity: Yes     Partners: Female     Birth control/protection: None       Medications:   Current Outpatient Medications:   •  allopurinol (ZYLOPRIM) 300 MG tablet, , Disp: , Rfl:   •  glipizide  "(GLUCOTROL XL) 10 MG 24 hr tablet, Take 1 tablet by mouth Daily., Disp: , Rfl:   •  Jardiance 25 MG tablet tablet, 1 tablet Daily., Disp: , Rfl:   •  levothyroxine (SYNTHROID, LEVOTHROID) 100 MCG tablet, Take 1 tablet by mouth Daily., Disp: , Rfl:   •  losartan (COZAAR) 100 MG tablet, Take 1 tablet by mouth Daily., Disp: , Rfl:   •  meloxicam (MOBIC) 7.5 MG tablet, 1 Oral Daily with food., Disp: 30 tablet, Rfl: 1  •  metFORMIN ER (GLUCOPHAGE-XR) 500 MG 24 hr tablet, Every 12 (Twelve) Hours., Disp: , Rfl:   •  metoprolol succinate XL (TOPROL-XL) 50 MG 24 hr tablet, Take 1 tablet by mouth Daily., Disp: , Rfl:     Allergies: No Known Allergies    The following portions of the patient's history were reviewed and updated as appropriate: allergies, current medications, past family history, past medical history, past social history, past surgical history and problem list.        Objective    Objective      Vital Signs:   Vitals:    03/04/25 0807   BP: 152/100   Weight: (!) 137 kg (301 lb)   Height: 180.3 cm (70.98\")       Ortho Exam:  Left shoulder improvements in periscapular pain still has some discomfort with DLS testing but more latissimus and periscapular type pain overall well-built excellent strength    Results Review:  Imaging Results (Last 24 Hours)       ** No results found for the last 24 hours. **            Procedures            Assessment / Plan      Assessment/Plan:   Problem List Items Addressed This Visit    None      Left shoulder work-related injury improvements noted clinically still has some limitations and still some concerns about return to full duty.    Left shoulder 20 pound lift restriction, no overhead lifting with left arm    Follow Up: 6 weeks, no x-rays needed  X-rays at next clinic appointment: None      Canelo Hwang MD, FAAOS  Orthopedic Surgeon, Shoulder Surgery  [facility]  Orthopedics and Sports Medicine  [address], [address]  [place] [address]    & New " Location:  [facility] Location  [address], [address]  [place] [address]    03/04/25  08:11 EST

## 2025-03-05 ENCOUNTER — TELEPHONE (OUTPATIENT)
Dept: ORTHOPEDIC SURGERY | Facility: CLINIC | Age: 30
End: 2025-03-05

## 2025-03-05 NOTE — TELEPHONE ENCOUNTER
Caller: MARISEL COOK      What form or medical record are you requesting: WORK NOTE FROM 03/04/2025 AND WORK STATUS     Who is requesting this form or medical record from you: W/C     How would you like to receive the form or medical records (pick-up, mail, fax): FAX   If fax, what is the fax number: [phone]    Timeframe paperwork needed: ASAP

## 2025-04-15 ENCOUNTER — OFFICE VISIT (OUTPATIENT)
Age: 30
End: 2025-04-15
Payer: OTHER MISCELLANEOUS

## 2025-04-15 ENCOUNTER — TELEPHONE (OUTPATIENT)
Dept: ORTHOPEDIC SURGERY | Facility: CLINIC | Age: 30
End: 2025-04-15
Payer: COMMERCIAL

## 2025-04-15 VITALS
SYSTOLIC BLOOD PRESSURE: 142 MMHG | DIASTOLIC BLOOD PRESSURE: 104 MMHG | WEIGHT: 295 LBS | BODY MASS INDEX: 41.3 KG/M2 | HEIGHT: 71 IN

## 2025-04-15 DIAGNOSIS — S29.012D STRAIN OF LATISSIMUS DORSI MUSCLE, SUBSEQUENT ENCOUNTER: ICD-10-CM

## 2025-04-15 DIAGNOSIS — G25.89 SCAPULAR DYSKINESIS: ICD-10-CM

## 2025-04-15 DIAGNOSIS — S43.432A SUPERIOR GLENOID LABRUM LESION OF LEFT SHOULDER, INITIAL ENCOUNTER: Primary | ICD-10-CM

## 2025-04-15 DIAGNOSIS — S43.432A PARALABRAL CYST OF LEFT SHOULDER, INITIAL ENCOUNTER: ICD-10-CM
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
## 2025-04-15 DIAGNOSIS — M89.8X1 PERISCAPULAR PAIN: ICD-10-CM

## 2025-04-15 PROBLEM — S29.012A STRAIN OF LATISSIMUS DORSI MUSCLE: Status: ACTIVE | Noted: 2025-04-15

## 2025-04-15 PROCEDURE — 99212 OFFICE O/P EST SF 10 MIN: CPT | Performed by: ORTHOPAEDIC SURGERY

## 2025-04-15 NOTE — PROGRESS NOTES
[facility]opaedic Surgery Office Follow Up - Canelo Hwang MD  [facility]    Office Follow Up Visit       Patient Name: Markell Del Castillo    Chief Complaint:   Chief Complaint   Patient presents with   • Follow-up     6 week recheck - Superior glenoid labrum lesion of left shoulder;   Work related injury;  Paralabral cyst of left shoulder; Periscapular pain         Referring Physician: Bettina Vera DO  - I appreciate the referral    History of Present Illness:   It has been 6  week(s) since Markell Del Castillo's last visit. Markell Del Castillo returns to clinic today for F/U: follow-up of leftBody Part: shoulderReason: pain. The issue has been ongoing for 1 year(s). Markell Del Castillo rates HIS/HER: hispain at 3/10 on the pain scale. Previous/current treatments: NSAIDS, physical therapy, and steroid injection (last injection 11/2024). Current symptoms:Symptoms: pain, popping, and grinding. The pain is worse with lying on affected side and random movement ;  Overall, he/she: heis doing the same. I have reviewed the patient's history of present illness as noted/entered above.    I have reviewed the patient's past medical history, surgical history, social history, family history, medications, and allergies as noted in the electronic medical record and as noted/entered.  I have reviewed the patient's review of systems as noted/enter and updated as noted in the patient's HPI.      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, prior he was  on machine for 8 years  Date of Injury at Work: November 2024  Mechanism of Injury at Work: repetitive overuse injury; 3 step lift and turn, using both arms/2 sides; left sided machine; 30lbs part 3000x/day  Current Work Status: work restrictions 10lbs no  over shoulder height, no vibrating hand tools  TREATMENTS: Ibuprofen, shoulder home exercises - guided exercises, biofreeze, injection  BMI 42.32, 303lbs  Diagnostic Tests: MRI left shoulder 1/9/2025     LEFT SHOULDER MRI [facility] and Open MRI [place] 1/9/2025     He has 3 areas of pain-some pain about the AC joint, latissimus, periscapular pain     [place]  He's from [facility]  His supportive wife is from [facility]; [place]     Enjoys: reading, cooking, traveling          I personally reviewed the MRI above he does have a posterior labral tear with paralabral cyst formation, SLAP tearing with posterior extension.  Some signal around the AC joint, AC joint strain        29 y.o. male  Body mass index is 42.32 kg/m².     PRIOR:  Left AC joint strain, periscapular pain, latissimus strain-I recommend scapular based physical therapy we did discuss operative versus nonoperative measures.  Operative measures would entail dealing with his posterior labral tearing, SLAP tearing however the bulk of his symptoms are more periscapular.  This may be referred pain so surgery may still be in the equation but he is hopeful to avoid surgery and I agree with conservative course.  Meloxicam provided with counseling and physical therapy prescription for specific scapular based physical therapy     Left shoulder  10 pound lift restriction  No overhead lifting with left arm  No use of vibratory tools with left arm        3/4/2025:  LEFT SHOULDER  Completed PT  Improved/better but still has some concerns about heavy lifting he has 1 process where he has to lift at least 30 pounds or more to 200 times in 2-hour shifts.  Supportive wife present.  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  Negative for chills, fatigue, fever, unexpected weight gain and unexpected weight loss.   HENT: Negative.  Negative for congestion, dental problem, postnasal drip and rhinorrhea.    Eyes: Negative.  Negative for blurred vision.   Respiratory: Negative.  Negative for shortness of breath.    Cardiovascular: Negative.  Negative for leg swelling.   Gastrointestinal: Negative.  Negative for abdominal pain, nausea and vomiting.   Endocrine: Negative.  Negative for polyuria.   Genitourinary: Negative.  Negative for difficulty urinating.   Musculoskeletal:  Positive for arthralgias. Negative for gait problem, joint swelling and myalgias.   Skin:  Negative for skin lesions and wound.   Allergic/Immunologic: Negative.    Neurological: Negative.  Negative for dizziness, weakness, light-headedness and numbness.   Hematological: Negative.  Negative for adenopathy. Does not bruise/bleed easily.   Psychiatric/Behavioral: Negative.  Negative for behavioral problems and depressed mood.    All other systems reviewed and are negative.       Past Medical History:   Past Medical History:   Diagnosis Date   • Diabetes mellitus    • Disease of thyroid gland        Past Surgical History: No past surgical history on file.    Family History:   Family History   Problem Relation Age of Onset   • Diabetes Maternal Grandmother    • Diabetes Maternal Uncle        Social History:   Social History     Socioeconomic History   • Marital status:    Tobacco Use   • Smoking status: Former     Current packs/day: 0.00     Average packs/day: 0.3 packs/day for 20.8 years (5.2 ttl pk-yrs)     Types: Cigarettes,  "Cigars     Start date: 2001     Quit date: 2022     Years since quittin.8   • Smokeless tobacco: Never   • Tobacco comments:     I smoked alot when I was a kid. Stopped when I was 13. Started again at 16. Stopped at 19.   Vaping Use   • Vaping status: Never Used   Substance and Sexual Activity   • Alcohol use: Not Currently   • Drug use: Never   • Sexual activity: Yes     Partners: Female     Birth control/protection: None       Medications:   Current Outpatient Medications:   •  allopurinol (ZYLOPRIM) 300 MG tablet, , Disp: , Rfl:   •  glipizide (GLUCOTROL XL) 10 MG 24 hr tablet, Take 1 tablet by mouth Daily., Disp: , Rfl:   •  Jardiance 25 MG tablet tablet, 1 tablet Daily., Disp: , Rfl:   •  levothyroxine (SYNTHROID, LEVOTHROID) 100 MCG tablet, Take 1 tablet by mouth Daily., Disp: , Rfl:   •  losartan (COZAAR) 100 MG tablet, Take 1 tablet by mouth Daily., Disp: , Rfl:   •  meloxicam (MOBIC) 7.5 MG tablet, 1 Oral Daily with food., Disp: 30 tablet, Rfl: 1  •  metFORMIN ER (GLUCOPHAGE-XR) 500 MG 24 hr tablet, Every 12 (Twelve) Hours., Disp: , Rfl:   •  metoprolol succinate XL (TOPROL-XL) 50 MG 24 hr tablet, Take 1 tablet by mouth Daily., Disp: , Rfl:     Allergies: No Known Allergies    The following portions of the patient's history were reviewed and updated as appropriate: allergies, current medications, past family history, past medical history, past social history, past surgical history and problem list.        Objective    Objective      Vital Signs:   Vitals:    04/15/25 0803 04/15/25 0817   BP: 142/95  Comment: Left arm manual (!) 142/104  Comment: took BP 2 times manually   Weight: 134 kg (295 lb)    Height: 180.3 cm (70.98\")        Ortho Exam:  LEFT SHOULDER  Continued recovery, excellent arc of motion and strength  Some occasional latissimus pain      Results Review:  Imaging Results (Last 24 Hours)       ** No results found for the last 24 hours. **                Procedures          "   Assessment / Plan      Assessment/Plan:   Problem List Items Addressed This Visit          Musculoskeletal and Injuries    Work related injury    Relevant Medications    meloxicam (MOBIC) 7.5 MG tablet    Superior glenoid labrum lesion of left shoulder - Primary    Relevant Medications    meloxicam (MOBIC) 7.5 MG tablet    Paralabral cyst of left shoulder    Relevant Medications    meloxicam (MOBIC) 7.5 MG tablet    Periscapular pain    Relevant Medications    meloxicam (MOBIC) 7.5 MG tablet    Strain of latissimus dorsi muscle       Neuro    Scapular dyskinesis    Relevant Medications    meloxicam (MOBIC) 7.5 MG tablet       LEFT SHOULDER  WORK NOTE -- office work now. Okay for full duty return, no restrictions  Team rechecked his BP today - patient and wife aware  Final check and MMI in 6 weeks    Follow Up: 6 weeks  X-rays at next clinic appointment: none      Canelo Hwang MD, FAAOS  Orthopedic Surgeon, Shoulder Surgery  [facility]  Orthopedics and Sports Medicine  [address], [address]  [place] [address]    & New Location:  [facility] Location  [address], [address]  [place] [address]    04/15/25  08:27 EDT

## 2025-04-15 NOTE — TELEPHONE ENCOUNTER
Caller: MARISEL/DORINDA COOK     Relationship:     Best call back number: [phone]     What form or medical record are you requesting: OFFICE NOTE FROM 04/15/2025     Who is requesting this form or medical record from you: SEE ABOVE     How would you like to receive the form or medical records (pick-up, mail, fax):     [phone]    Timeframe paperwork needed: ASAP    Additional notes:

## 2025-05-27 ENCOUNTER — OFFICE VISIT (OUTPATIENT)
Age: 30
End: 2025-05-27
Payer: OTHER MISCELLANEOUS

## 2025-05-27 VITALS
DIASTOLIC BLOOD PRESSURE: 100 MMHG | HEIGHT: 71 IN | BODY MASS INDEX: 42.08 KG/M2 | SYSTOLIC BLOOD PRESSURE: 150 MMHG | WEIGHT: 300.6 LBS

## 2025-05-27 DIAGNOSIS — S43.432A PARALABRAL CYST OF LEFT SHOULDER, INITIAL ENCOUNTER: ICD-10-CM

## 2025-05-27 DIAGNOSIS — S43.432A SUPERIOR GLENOID LABRUM LESION OF LEFT SHOULDER, INITIAL ENCOUNTER: Primary | ICD-10-CM

## 2025-05-27 DIAGNOSIS — G25.89 SCAPULAR DYSKINESIS: ICD-10-CM
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
## 2025-05-27 DIAGNOSIS — S29.012D STRAIN OF LATISSIMUS DORSI MUSCLE, SUBSEQUENT ENCOUNTER: ICD-10-CM

## 2025-05-27 DIAGNOSIS — M89.8X1 PERISCAPULAR PAIN: ICD-10-CM

## 2025-05-27 DIAGNOSIS — S43.52XA ACROMIOCLAVICULAR SPRAIN, LEFT, INITIAL ENCOUNTER: ICD-10-CM

## 2025-05-27 PROCEDURE — 99212 OFFICE O/P EST SF 10 MIN: CPT | Performed by: ORTHOPAEDIC SURGERY

## 2025-05-27 NOTE — PROGRESS NOTES
[facility] Follow Up - Canelo Hwang MD  [facility] and [facility]    Office Follow Up Visit       Patient Name: Markell Del Castillo    Chief Complaint:   Chief Complaint   Patient presents with   • Follow-up     6 week follow up -- Work related injury; Superior glenoid labrum lesion of left shoulder       Referring Physician: Bettina Vera, DO  - I appreciate the referral    History of Present Illness:   It has been 6  week(s) since Markell Del Castillo's last visit. Markell Del Castillo returns to clinic today for F/U: follow-up of leftBody Part: shoulderReason: pain. The issue has been ongoing for 1 year(s). Markell Del Castillo rates HIS/HER: hispain at 1/10 on the pain scale. Previous/current treatments: NSAIDS and physical therapy. Current symptoms:Symptoms: same as prior visit. The pain is worse with any movement of the joint; pain medication and/or NSAID and stretches improves the pain. Overall, he/she: heis doing the same. I have reviewed the patient's history of present illness as noted/entered above.    I have reviewed the patient's past medical history, surgical history, social history, family history, medications, and allergies as noted in the electronic medical record and as noted/entered.  I have reviewed the patient's review of systems as noted/enter and updated as noted in the patient's HPI.      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, prior he was  on machine for 8 years  Date of Injury at Work: November 2024  Mechanism of Injury at Work: repetitive overuse injury; 3 step lift and turn, using both arms/2 sides; left sided machine; 30lbs part 3000x/day  Current Work Status: work restrictions 10lbs no over shoulder height, no vibrating hand tools  TREATMENTS:  Ibuprofen, shoulder home exercises - guided exercises, biofreeze, injection  BMI 42.32, 303lbs  Diagnostic Tests: MRI left shoulder 1/9/2025     LEFT SHOULDER MRI [facility] and Open MRI [facility] 1/9/2025     He has 3 areas of pain-some pain about the AC joint, latissimus, periscapular pain     [place]  He's from [facility] [facility] [place]  His supportive wife is from [place]; [place]     Enjoys: reading, cooking, traveling        I personally reviewed the MRI above he does have a posterior labral tear with paralabral cyst formation, SLAP tearing with posterior extension.  Some signal around the AC joint, AC joint strain        29 y.o. male  Body mass index is 42.32 kg/m².     PRIOR:  Left AC joint strain, periscapular pain, latissimus strain-I recommend scapular based physical therapy we did discuss operative versus nonoperative measures.  Operative measures would entail dealing with his posterior labral tearing, SLAP tearing however the bulk of his symptoms are more periscapular.  This may be referred pain so surgery may still be in the equation but he is hopeful to avoid surgery and I agree with conservative course.  Meloxicam provided with counseling and physical therapy prescription for specific scapular based physical therapy     Left shoulder  10 pound lift restriction  No overhead lifting with left arm  No use of vibratory tools with left arm        3/4/2025:  LEFT SHOULDER  Completed PT  Improved/better but still has some concerns about heavy lifting he has 1 process where he has to lift at least 30 pounds or more to 200 times in 2-hour shifts.  Supportive wife present.  PT with [employer] work  Counseled on increasing the weight amount to 20 pounds still avoiding overhead lifting with hopes that he will be ready for a full duty when we see him back in 6 weeks with 1 additional visit to follow anticipate     4/15/2025:  LEFT SHOULDER  PT completed  Feels about the same,  unchanged  [employer]  Injury was 2024  Transitioned to a new role a couple of weeks ago - [employer], Office role  Before his new role he was doing a 2-person job by himself for about 4 weeks    2026:  LEFT SHOULDER  Feels the same  New role has helped  Work injury 2024, 6 months post-injury    He really enjoys his new role he still occasionally has some latissimus pain continues with a home exercise program otherwise doing well        Subjective   Subjective      Review of Systems   Constitutional: Negative.  Negative for chills, fatigue and fever.   HENT: Negative.  Negative for congestion and dental problem.    Eyes: Negative.  Negative for blurred vision.   Respiratory: Negative.  Negative for shortness of breath.    Cardiovascular: Negative.  Negative for leg swelling.   Gastrointestinal: Negative.  Negative for abdominal pain.   Endocrine: Negative.  Negative for polyuria.   Genitourinary: Negative.  Negative for difficulty urinating.   Musculoskeletal:  Positive for arthralgias.   Skin: Negative.    Allergic/Immunologic: Negative.    Neurological: Negative.    Hematological: Negative.  Negative for adenopathy.   Psychiatric/Behavioral: Negative.  Negative for behavioral problems.         Past Medical History:   Past Medical History:   Diagnosis Date   • Diabetes mellitus    • Disease of thyroid gland        Past Surgical History: No past surgical history on file.    Family History:   Family History   Problem Relation Age of Onset   • Diabetes Maternal Grandmother    • Diabetes Maternal Uncle        Social History:   Social History     Socioeconomic History   • Marital status:    Tobacco Use   • Smoking status: Former     Current packs/day: 0.00     Average packs/day: 0.3 packs/day for 20.8 years (5.2 ttl pk-yrs)     Types: Cigarettes, Cigars     Start date: 2001     Quit date: 2022     Years since quittin.9   • Smokeless tobacco: Never   • Tobacco comments:     " I smoked alot when I was a kid. Stopped when I was 13. Started again at 16. Stopped at 19.   Vaping Use   • Vaping status: Never Used   Substance and Sexual Activity   • Alcohol use: Not Currently   • Drug use: Never   • Sexual activity: Yes     Partners: Female     Birth control/protection: None       Medications:   Current Outpatient Medications:   •  allopurinol (ZYLOPRIM) 300 MG tablet, , Disp: , Rfl:   •  glipizide (GLUCOTROL XL) 10 MG 24 hr tablet, Take 1 tablet by mouth Daily., Disp: , Rfl:   •  Jardiance 25 MG tablet tablet, 1 tablet Daily., Disp: , Rfl:   •  levothyroxine (SYNTHROID, LEVOTHROID) 100 MCG tablet, Take 1 tablet by mouth Daily., Disp: , Rfl:   •  losartan (COZAAR) 100 MG tablet, Take 1 tablet by mouth Daily., Disp: , Rfl:   •  meloxicam (MOBIC) 7.5 MG tablet, 1 Oral Daily with food., Disp: 30 tablet, Rfl: 1  •  metFORMIN ER (GLUCOPHAGE-XR) 500 MG 24 hr tablet, Every 12 (Twelve) Hours., Disp: , Rfl:   •  metoprolol succinate XL (TOPROL-XL) 50 MG 24 hr tablet, Take 1 tablet by mouth Daily., Disp: , Rfl:     Allergies: No Known Allergies    The following portions of the patient's history were reviewed and updated as appropriate: allergies, current medications, past family history, past medical history, past social history, past surgical history and problem list.        Objective    Objective      Vital Signs:   Vitals:    05/27/25 0810   BP: 150/100   Weight: (!) 136 kg (300 lb 9.6 oz)   Height: 180.3 cm (70.98\")       Ortho Exam:  Left shoulder doing fantastic excellent strength negative DLS today  Symmetric arc of motion          Assessment / Plan      Assessment/Plan:   Problem List Items Addressed This Visit          Musculoskeletal and Injuries    Work related injury    Relevant Medications    meloxicam (MOBIC) 7.5 MG tablet    Superior glenoid labrum lesion of left shoulder - Primary    Relevant Medications    meloxicam (MOBIC) 7.5 MG tablet    Paralabral cyst of left shoulder    Relevant " Medications    meloxicam (MOBIC) 7.5 MG tablet    Periscapular pain    Relevant Medications    meloxicam (MOBIC) 7.5 MG tablet    Strain of latissimus dorsi muscle       Neuro    Scapular dyskinesis    Relevant Medications    meloxicam (MOBIC) 7.5 MG tablet       Other    Acromioclavicular sprain, left, initial encounter       Left shoulder still has some occasional latissimus strain but doing well in his current role okay to remain full duty no restrictions with left upper extremity    MMI reached 5/27/2025  No additional treatment anticipated at this time no additional surgery anticipated at this time no permanent restrictions or impairments noted currently    Follow Up: As needed        Canelo Hwang MD, FAAOS  Orthopedic Surgeon, Shoulder Surgery  [facility]  Orthopedics and Sports Medicine  [address], [address]  [place] [address]    & New Location:  [facility] Location  [address], [address]  [place] [address]    05/27/25  08:37 EDT